# Patient Record
Sex: FEMALE | Race: WHITE | NOT HISPANIC OR LATINO | Employment: FULL TIME | URBAN - METROPOLITAN AREA
[De-identification: names, ages, dates, MRNs, and addresses within clinical notes are randomized per-mention and may not be internally consistent; named-entity substitution may affect disease eponyms.]

---

## 2017-01-09 ENCOUNTER — HOSPITAL ENCOUNTER (OUTPATIENT)
Dept: OBSTETRICS AND GYNECOLOGY | Facility: HOSPITAL | Age: 22
End: 2017-01-09
Attending: OBSTETRICS & GYNECOLOGY | Admitting: OBSTETRICS & GYNECOLOGY

## 2017-09-20 ENCOUNTER — HISTORICAL (OUTPATIENT)
Dept: LAB | Facility: HOSPITAL | Age: 22
End: 2017-09-20

## 2018-08-22 ENCOUNTER — HISTORICAL (OUTPATIENT)
Dept: ADMINISTRATIVE | Facility: HOSPITAL | Age: 23
End: 2018-08-22

## 2019-02-06 ENCOUNTER — HISTORICAL (OUTPATIENT)
Dept: RADIOLOGY | Facility: HOSPITAL | Age: 24
End: 2019-02-06

## 2019-03-08 ENCOUNTER — HOSPITAL ENCOUNTER (OUTPATIENT)
Dept: OCCUPATIONAL THERAPY | Facility: HOSPITAL | Age: 24
End: 2019-03-09
Attending: OTOLARYNGOLOGY | Admitting: OTOLARYNGOLOGY

## 2019-03-08 LAB — POC BETA-HCG (QUAL): NEGATIVE

## 2019-06-26 ENCOUNTER — HISTORICAL (OUTPATIENT)
Dept: RADIOLOGY | Facility: HOSPITAL | Age: 24
End: 2019-06-26

## 2019-07-11 ENCOUNTER — HISTORICAL (OUTPATIENT)
Dept: LAB | Facility: HOSPITAL | Age: 24
End: 2019-07-11

## 2019-07-11 LAB
B-HCG FREE SERPL-ACNC: <1 MIU/ML
POC BETA-HCG (QUAL): NEGATIVE

## 2020-01-02 ENCOUNTER — HISTORICAL (OUTPATIENT)
Dept: LAB | Facility: HOSPITAL | Age: 25
End: 2020-01-02

## 2020-04-22 LAB
CLARITY, POC UA: CLEAR
COLOR, POC UA: YELLOW
GLUCOSE UR QL STRIP: NEGATIVE
LEUKOCYTE EST, POC UA: NEGATIVE
NITRITE, POC UA: NEGATIVE
PROTEIN, POC: NEGATIVE

## 2020-05-19 LAB
CLARITY, POC UA: CLEAR
COLOR, POC UA: YELLOW
GLUCOSE UR QL STRIP: NEGATIVE
LEUKOCYTE EST, POC UA: NEGATIVE
NITRITE, POC UA: NEGATIVE
PROTEIN, POC: NORMAL

## 2020-06-30 LAB
CLARITY, POC UA: NORMAL
COLOR, POC UA: YELLOW
GLUCOSE UR QL STRIP: NEGATIVE
LEUKOCYTE EST, POC UA: NEGATIVE
NITRITE, POC UA: NEGATIVE
PROTEIN, POC: NEGATIVE

## 2021-01-06 ENCOUNTER — HISTORICAL (OUTPATIENT)
Dept: LAB | Facility: HOSPITAL | Age: 26
End: 2021-01-06

## 2021-01-06 LAB
ABS NEUT (OLG): 3.99
ALBUMIN SERPL-MCNC: 4.3 GM/DL (ref 3.5–5)
ALBUMIN/GLOB SERPL: 1.3 RATIO (ref 1.1–2)
ALP SERPL-CCNC: 110 UNIT/L (ref 40–150)
ALT SERPL-CCNC: 21 UNIT/L (ref 0–55)
APPEARANCE, UA: CLEAR
AST SERPL-CCNC: 17 UNIT/L (ref 5–34)
BACTERIA SPEC CULT: NORMAL
BASOPHILS # BLD AUTO: 0.02 X10(3)/MCL
BASOPHILS NFR BLD AUTO: 0.3 %
BILIRUB SERPL-MCNC: 0.5 MG/DL
BILIRUB UR QL STRIP: NEGATIVE
BILIRUBIN DIRECT+TOT PNL SERPL-MCNC: 0.2 MG/DL (ref 0–0.5)
BILIRUBIN DIRECT+TOT PNL SERPL-MCNC: 0.3 MG/DL
BUN SERPL-MCNC: 12 MG/DL (ref 7–18.7)
CALCIUM SERPL-MCNC: 9.4 MG/DL (ref 8.4–10.2)
CHLORIDE SERPL-SCNC: 107 MMOL/L (ref 98–107)
CO2 SERPL-SCNC: 28 MEQ/L (ref 22–29)
COLOR UR: YELLOW
CREAT SERPL-MCNC: 0.72 MG/DL (ref 0.55–1.02)
DEPRECATED CALCIDIOL+CALCIFEROL SERPL-MC: 15.8 NG/ML (ref 30–80)
EOSINOPHIL # BLD AUTO: 0.05 X10(3)/MCL
EOSINOPHIL NFR BLD AUTO: 0.8 %
ERYTHROCYTE [DISTWIDTH] IN BLOOD BY AUTOMATED COUNT: 12 %
GLOBULIN SER-MCNC: 3.4 GM/DL (ref 2.4–3.5)
GLUCOSE (UA): NEGATIVE
GLUCOSE SERPL-MCNC: 92 MG/DL (ref 74–100)
HCT VFR BLD AUTO: 41.7 % (ref 34–46)
HGB BLD-MCNC: 13.2 GM/DL (ref 11.3–15.4)
HGB UR QL STRIP: NEGATIVE
IMM GRANULOCYTES # BLD AUTO: 0.01 10*3/UL (ref 0–0.1)
IMM GRANULOCYTES NFR BLD AUTO: 0.2 % (ref 0–1)
KETONES UR QL STRIP: NEGATIVE
LEUKOCYTE ESTERASE UR QL STRIP: NEGATIVE
LYMPHOCYTES # BLD AUTO: 1.47 X10(3)/MCL
LYMPHOCYTES NFR BLD AUTO: 24.3 %
MCH RBC QN AUTO: 28.8 PG (ref 27–33)
MCHC RBC AUTO-ENTMCNC: 31.7 GM/DL (ref 32–35)
MCV RBC AUTO: 90.8 FL (ref 81–97)
MONOCYTES # BLD AUTO: 0.51 X10(3)/MCL
MONOCYTES NFR BLD AUTO: 8.4 %
MUCOUS THREADS URNS QL MICRO: PRESENT
NEUTROPHILS # BLD AUTO: 3.99 X10(3)/MCL
NEUTROPHILS NFR BLD AUTO: 66 %
NITRITE UR QL STRIP: NEGATIVE
PH UR STRIP: 6.5 [PH] (ref 4.6–8)
PLATELET # BLD AUTO: 171 X10(3)/MCL (ref 140–450)
PMV BLD AUTO: 10 FL
POTASSIUM SERPL-SCNC: 3.7 MMOL/L (ref 3.5–5.1)
PROT SERPL-MCNC: 7.7 GM/DL (ref 6.4–8.3)
PROT UR QL STRIP: NEGATIVE
RBC # BLD AUTO: 4.59 X10(6)/MCL (ref 3.9–5)
RBC #/AREA URNS HPF: NORMAL /[HPF]
SODIUM SERPL-SCNC: 142 MMOL/L (ref 136–145)
SP GR UR STRIP: 1.02 (ref 1–1.03)
SQUAMOUS EPITHELIAL, UA: NORMAL
TSH SERPL-ACNC: 0.44 UIU/ML (ref 0.35–4.94)
UROBILINOGEN UR STRIP-ACNC: 0.2
VIT B12 SERPL-MCNC: 361 PG/ML (ref 213–816)
WBC # SPEC AUTO: 6.05 X10(3)/MCL (ref 3.4–9.2)
WBC #/AREA URNS HPF: NORMAL /HPF

## 2021-02-18 ENCOUNTER — HISTORICAL (OUTPATIENT)
Dept: RADIOLOGY | Facility: HOSPITAL | Age: 26
End: 2021-02-18

## 2021-06-07 ENCOUNTER — HISTORICAL (OUTPATIENT)
Dept: RADIOLOGY | Facility: HOSPITAL | Age: 26
End: 2021-06-07

## 2021-06-10 ENCOUNTER — HISTORICAL (OUTPATIENT)
Dept: LAB | Facility: HOSPITAL | Age: 26
End: 2021-06-10

## 2021-12-02 ENCOUNTER — HISTORICAL (OUTPATIENT)
Dept: LAB | Facility: HOSPITAL | Age: 26
End: 2021-12-02

## 2021-12-02 LAB
T4 FREE SERPL-MCNC: 0.66 NG/DL (ref 0.7–1.48)
TSH SERPL-ACNC: 1.96 UIU/ML (ref 0.35–4.94)

## 2021-12-29 ENCOUNTER — HISTORICAL (OUTPATIENT)
Dept: LAB | Facility: HOSPITAL | Age: 26
End: 2021-12-29

## 2021-12-29 LAB
FLUAV AG UPPER RESP QL IA.RAPID: NEGATIVE
FLUBV AG UPPER RESP QL IA.RAPID: NEGATIVE
SARS-COV-2 RNA RESP QL NAA+PROBE: NOT DETECTED

## 2022-04-07 ENCOUNTER — HISTORICAL (OUTPATIENT)
Dept: ADMINISTRATIVE | Facility: HOSPITAL | Age: 27
End: 2022-04-07
Payer: MEDICAID

## 2022-04-24 VITALS
SYSTOLIC BLOOD PRESSURE: 106 MMHG | OXYGEN SATURATION: 99 % | DIASTOLIC BLOOD PRESSURE: 75 MMHG | WEIGHT: 162.06 LBS | BODY MASS INDEX: 27 KG/M2 | HEIGHT: 65 IN

## 2022-04-28 NOTE — OP NOTE
DATE OF SURGERY:    03/08/2019    SURGEON:  Eduar Huitron MD    PREOPERATIVE DIAGNOSIS:  Thyroglossal duct cyst.    POSTOPERATIVE DIAGNOSIS:  Thyroglossal duct cyst.    PROCEDURE PERFORMED:  Is excision thyroglossal duct cyst with removal of pyramidal middle lobe of the thyroid.    ANESTHESIA:  General endotracheal.    ESTIMATED BLOOD LOSS:  Is 79 mL.    INTRAOPERATIVE MEDICATIONS:  Clindamycin.    INTRAOPERATIVE FINDINGS:  Thyroglossal duct cyst in continuity with the pyramidal lobe of the thyroid.    INDICATION:  Tracey Montenegro is a 23-year-old lady who has a history of the above-mentioned problem.  She understood the risks, benefits, and alternatives to the procedure and consented to it.    DETAILS OF PROCEDURE:  She was brought to the operating room on 03/08/2019, anesthesia was induced in standard fashion with no complication.  The correct patient, procedure, and site were identified, and confirmed with OR personnel.  The operative site was inspected and then the area was prepped and draped in usual sterile fashion.  1/2% Marcaine with epi was injected in the standard dermal area and the 15 blade used to make an incision in a standard thyroid orientation.  The platysma was divided along with vessels.  Skin flaps were developed superiorly and inferiorly.  The strap muscles were retracted laterally out of the way and the thyroglossal duct cyst was exposed.  It was followed upwards to the point where it became simply a stalk of fascial-type tissue and was ligated at that area.  It was followed inferiorly to where it joined the pyramidal lobe of the thyroid.  This was  from the thyroid with the Bovie and sent for frozen section.  There was no atypia found on the frozen section.  The thyroid was then reapproximated in the midline by suturing the 2 lobes together with a 2-0 Vicryl suture and hemostasis was thus achieved.  An additional layer of hemostasis was achieved with an installation of 3  Iv started,blood sent to lab. Repositioned pt for comfort.  Called wound nurse , instructed wet to dry temporary dressing.    grams of hemostatic powder.  The platysma and dermis were then closed with deep 2-0 Vicryl suture and superficial running subcuticular 5-0 Monocryl over a ONEIDA 10 round drain.  It  was noted that there was an incidental deflating of the balloon and popping of the balloon in the lumen of the trachea when the needle was passed     and it went between the rings of the trachea and popped the balloon, but there was no untoward sequela from this.  The patient was then awakened and brought to recovery.        ______________________________  Eduar Huitron MD    CG/UM  DD:  03/08/2019  Time:  01:05PM  DT:  03/08/2019  Time:  01:22PM  Job #:  [id number]

## 2022-04-28 NOTE — DISCHARGE SUMMARY
Patient:   Tracey Montenegro            MRN: [id number]            FIN: [id number]               Age:   23 years     Sex:  Female     :  1995   Associated Diagnoses:   None   Author:   Eduar Huitron MD      tolerating diet  pain controlled with percocet    afebrile, vital signs stable     voice slightly rough  no hematoma  sutures and strips intact  drain DCd by me    Doing well  DC to home RTC 1 week

## 2022-04-28 NOTE — H&P
PREOPERATIVE DIAGNOSIS:  Thyroglossal duct cyst/neck mass.    HISTORY OF PRESENT ILLNESS:  Tracey Montenegro is a 23-year-old lady who has a history of a neck mass being present for approximately 4 years.  It has been slowly growing with no areas of drainage.  There has been some dysphagia and tenderness at the area.    DATA:  Reviewed.  Ultrasound of the neck shows a 2.5 cm midline complex cystic lesion.    PAST SURGICAL HISTORY:  Nexplanon placed in August 2018, as well as C section.    ALLERGIES:  NO KNOWN DRUG ALLERGIES MEDICATIONS.    SOCIAL HISTORY:  She is an occasional drinker and nonsmoker.  She works as a .  she is breastfeeding    PAST MEDICAL HISTORY:  Also includes depression.    REVIEW OF SYSTEMS:  EYES:  No blurry vision.     RESPIRATORY:  No shortness of breath, wheezing, cough.   CARDIOVASCULAR:  No chest pain, palpitations, or edema.   GASTROINTESTINAL:  No nausea, vomiting, but occasional diarrhea.    PHYSICAL EXAMINATION:  NECK:  Shows a 3 x 3 cm mass just anterior to the hyoid bone.  It goes up and down with swallowing.  It is only moderately fixed to underlying structures.  It does seem to be under the strap muscles of the neck.  There is a normal palpable thyroid in standard configuration.     HEENT:  Oral cavity oropharynx normal mucosa.  Extraocular motion intact.  Normal nasal mucosa.  Normal tympanic membrane and middle ear.     LUNGS:  Clear to auscultation bilaterally.    ASSESSMENT:  Thyroglossal duct cyst.      PLAN:  Excision.  We discussed risks, benefits, and alternatives at length in layman's terms.  We discussed the rare, but serious, things that could occur, as well as more common complications, and expectations.  Consent obtained.  All questions answered.  Proceed on 03/08.        ______________________________  Eduar Huitron MD    CG/UN  DD:  03/07/2019  Time:  02:03PM  DT:  03/07/2019  Time:  02:43PM  Job #:  [id number]    The H&P was reviewed, the patient was  examined, and the following changes to the patients condition are noted:  ______________________________________________________________________________  ______________________________________________________________________________  ______________________________________________________________________________  [  ] No changes to the patient's condition:      ______________________________                                             ___________________  PHYSICIAN SIGNATURE                                                             DATE/TIME

## 2022-05-04 RX ORDER — LORATADINE 10 MG/1
TABLET ORAL
COMMUNITY
Start: 2022-04-21 | End: 2023-05-29

## 2022-05-04 RX ORDER — FLUTICASONE PROPIONATE 50 MCG
SPRAY, SUSPENSION (ML) NASAL
COMMUNITY
Start: 2022-04-21 | End: 2022-06-14

## 2022-05-04 RX ORDER — SUMATRIPTAN 50 MG/1
TABLET, FILM COATED ORAL
COMMUNITY
Start: 2022-01-31 | End: 2022-06-14 | Stop reason: SDUPTHER

## 2022-05-04 RX ORDER — FLUOXETINE HYDROCHLORIDE 20 MG/1
20 CAPSULE ORAL DAILY
COMMUNITY
Start: 2021-12-02 | End: 2022-05-04 | Stop reason: SDUPTHER

## 2022-05-04 RX ORDER — TOPIRAMATE 25 MG/1
50 TABLET ORAL
COMMUNITY
Start: 2021-08-13 | End: 2022-06-14 | Stop reason: SDUPTHER

## 2022-05-04 RX ORDER — PROPRANOLOL HYDROCHLORIDE 10 MG/1
10 TABLET ORAL
COMMUNITY
Start: 2021-12-02 | End: 2022-06-14 | Stop reason: SDUPTHER

## 2022-05-04 RX ORDER — MONTELUKAST SODIUM 10 MG/1
TABLET ORAL
COMMUNITY
Start: 2022-04-21 | End: 2022-06-14

## 2022-05-04 RX ORDER — FLUOXETINE HYDROCHLORIDE 20 MG/1
20 CAPSULE ORAL DAILY
Qty: 90 CAPSULE | Refills: 0 | Status: SHIPPED | OUTPATIENT
Start: 2022-05-04 | End: 2022-06-29 | Stop reason: SDUPTHER

## 2022-06-08 ENCOUNTER — HOSPITAL ENCOUNTER (OUTPATIENT)
Dept: RADIOLOGY | Facility: HOSPITAL | Age: 27
Discharge: HOME | End: 2022-06-08
Attending: FAMILY MEDICINE
Payer: MEDICAID

## 2022-06-08 DIAGNOSIS — E04.2 MULTIPLE THYROID NODULES: ICD-10-CM

## 2022-06-08 PROCEDURE — 76536 US EXAM OF HEAD AND NECK: CPT | Mod: TC

## 2022-06-14 ENCOUNTER — OFFICE VISIT (OUTPATIENT)
Dept: FAMILY MEDICINE | Facility: CLINIC | Age: 27
End: 2022-06-14
Payer: MEDICAID

## 2022-06-14 ENCOUNTER — LAB VISIT (OUTPATIENT)
Dept: LAB | Facility: HOSPITAL | Age: 27
End: 2022-06-14
Attending: FAMILY MEDICINE
Payer: MEDICAID

## 2022-06-14 VITALS
TEMPERATURE: 98 F | WEIGHT: 169.56 LBS | OXYGEN SATURATION: 99 % | HEIGHT: 65 IN | HEART RATE: 79 BPM | RESPIRATION RATE: 16 BRPM | DIASTOLIC BLOOD PRESSURE: 54 MMHG | BODY MASS INDEX: 28.25 KG/M2 | SYSTOLIC BLOOD PRESSURE: 101 MMHG

## 2022-06-14 DIAGNOSIS — F33.42 RECURRENT MAJOR DEPRESSIVE DISORDER, IN FULL REMISSION: ICD-10-CM

## 2022-06-14 DIAGNOSIS — E04.2 MULTIPLE THYROID NODULES: ICD-10-CM

## 2022-06-14 DIAGNOSIS — N94.2 VAGINISMUS: ICD-10-CM

## 2022-06-14 DIAGNOSIS — G43.009 MIGRAINE WITHOUT AURA AND WITHOUT STATUS MIGRAINOSUS, NOT INTRACTABLE: ICD-10-CM

## 2022-06-14 PROBLEM — F41.9 ANXIETY: Status: ACTIVE | Noted: 2022-06-14

## 2022-06-14 PROBLEM — F33.9 RECURRENT MAJOR DEPRESSIVE DISORDER: Status: ACTIVE | Noted: 2022-06-14

## 2022-06-14 LAB
ANION GAP SERPL CALC-SCNC: 10 MEQ/L
BASOPHILS # BLD AUTO: 0.05 X10(3)/MCL (ref 0–0.2)
BASOPHILS NFR BLD AUTO: 0.9 %
BUN SERPL-MCNC: 8 MG/DL (ref 7–18.7)
CALCIUM SERPL-MCNC: 9 MG/DL (ref 8.4–10.2)
CHLORIDE SERPL-SCNC: 110 MMOL/L (ref 98–107)
CO2 SERPL-SCNC: 24 MMOL/L (ref 22–29)
CREAT SERPL-MCNC: 0.78 MG/DL (ref 0.55–1.02)
CREAT/UREA NIT SERPL: 10
EOSINOPHIL # BLD AUTO: 0.11 X10(3)/MCL (ref 0–0.9)
EOSINOPHIL NFR BLD AUTO: 1.9 %
ERYTHROCYTE [DISTWIDTH] IN BLOOD BY AUTOMATED COUNT: 13 % (ref 11.5–17)
GLUCOSE SERPL-MCNC: 111 MG/DL (ref 74–100)
HCT VFR BLD AUTO: 35.1 % (ref 37–47)
HGB BLD-MCNC: 11.1 GM/DL (ref 12–16)
IMM GRANULOCYTES # BLD AUTO: 0.01 X10(3)/MCL (ref 0–0.02)
IMM GRANULOCYTES NFR BLD AUTO: 0.2 % (ref 0–0.43)
LYMPHOCYTES # BLD AUTO: 1.76 X10(3)/MCL (ref 0.6–4.6)
LYMPHOCYTES NFR BLD AUTO: 30.6 %
MCH RBC QN AUTO: 28.8 PG (ref 27–31)
MCHC RBC AUTO-ENTMCNC: 31.6 MG/DL (ref 33–36)
MCV RBC AUTO: 90.9 FL (ref 80–94)
MONOCYTES # BLD AUTO: 0.34 X10(3)/MCL (ref 0.1–1.3)
MONOCYTES NFR BLD AUTO: 5.9 %
NEUTROPHILS # BLD AUTO: 3.5 X10(3)/MCL (ref 2.1–9.2)
NEUTROPHILS NFR BLD AUTO: 60.5 %
NRBC BLD AUTO-RTO: 0 %
PLATELET # BLD AUTO: 144 X10(3)/MCL (ref 130–400)
PMV BLD AUTO: 10.5 FL (ref 9.4–12.4)
POTASSIUM SERPL-SCNC: 3.7 MMOL/L (ref 3.5–5.1)
RBC # BLD AUTO: 3.86 X10(6)/MCL (ref 4.2–5.4)
SODIUM SERPL-SCNC: 144 MMOL/L (ref 136–145)
T4 FREE SERPL-MCNC: 0.85 NG/DL (ref 0.7–1.48)
TSH SERPL-ACNC: 0.82 UIU/ML (ref 0.35–4.94)
WBC # SPEC AUTO: 5.8 X10(3)/MCL (ref 4.5–11.5)

## 2022-06-14 PROCEDURE — 36415 COLL VENOUS BLD VENIPUNCTURE: CPT

## 2022-06-14 PROCEDURE — 1159F MED LIST DOCD IN RCRD: CPT | Mod: CPTII,,, | Performed by: FAMILY MEDICINE

## 2022-06-14 PROCEDURE — 3078F DIAST BP <80 MM HG: CPT | Mod: CPTII,,, | Performed by: FAMILY MEDICINE

## 2022-06-14 PROCEDURE — 1159F PR MEDICATION LIST DOCUMENTED IN MEDICAL RECORD: ICD-10-PCS | Mod: CPTII,,, | Performed by: FAMILY MEDICINE

## 2022-06-14 PROCEDURE — 3074F PR MOST RECENT SYSTOLIC BLOOD PRESSURE < 130 MM HG: ICD-10-PCS | Mod: CPTII,,, | Performed by: FAMILY MEDICINE

## 2022-06-14 PROCEDURE — 99214 PR OFFICE/OUTPT VISIT, EST, LEVL IV, 30-39 MIN: ICD-10-PCS | Mod: ,,, | Performed by: FAMILY MEDICINE

## 2022-06-14 PROCEDURE — 3008F PR BODY MASS INDEX (BMI) DOCUMENTED: ICD-10-PCS | Mod: CPTII,,, | Performed by: FAMILY MEDICINE

## 2022-06-14 PROCEDURE — 3008F BODY MASS INDEX DOCD: CPT | Mod: CPTII,,, | Performed by: FAMILY MEDICINE

## 2022-06-14 PROCEDURE — 1160F PR REVIEW ALL MEDS BY PRESCRIBER/CLIN PHARMACIST DOCUMENTED: ICD-10-PCS | Mod: CPTII,,, | Performed by: FAMILY MEDICINE

## 2022-06-14 PROCEDURE — 99214 OFFICE O/P EST MOD 30 MIN: CPT | Mod: ,,, | Performed by: FAMILY MEDICINE

## 2022-06-14 PROCEDURE — 3078F PR MOST RECENT DIASTOLIC BLOOD PRESSURE < 80 MM HG: ICD-10-PCS | Mod: CPTII,,, | Performed by: FAMILY MEDICINE

## 2022-06-14 PROCEDURE — 85025 COMPLETE CBC W/AUTO DIFF WBC: CPT

## 2022-06-14 PROCEDURE — 84443 ASSAY THYROID STIM HORMONE: CPT

## 2022-06-14 PROCEDURE — 80048 BASIC METABOLIC PNL TOTAL CA: CPT

## 2022-06-14 PROCEDURE — 84439 ASSAY OF FREE THYROXINE: CPT

## 2022-06-14 PROCEDURE — 1160F RVW MEDS BY RX/DR IN RCRD: CPT | Mod: CPTII,,, | Performed by: FAMILY MEDICINE

## 2022-06-14 PROCEDURE — 3074F SYST BP LT 130 MM HG: CPT | Mod: CPTII,,, | Performed by: FAMILY MEDICINE

## 2022-06-14 RX ORDER — PROPRANOLOL HYDROCHLORIDE 10 MG/1
30 TABLET ORAL 3 TIMES DAILY
Qty: 270 TABLET | Refills: 11 | Status: SHIPPED | OUTPATIENT
Start: 2022-06-14 | End: 2023-05-29 | Stop reason: SDUPTHER

## 2022-06-14 RX ORDER — TOPIRAMATE 25 MG/1
50 TABLET ORAL 2 TIMES DAILY
Qty: 120 TABLET | Refills: 11 | Status: SHIPPED | OUTPATIENT
Start: 2022-06-14 | End: 2023-05-29 | Stop reason: SDUPTHER

## 2022-06-14 RX ORDER — SUMATRIPTAN 50 MG/1
50 TABLET, FILM COATED ORAL ONCE
Qty: 9 TABLET | Refills: 6 | Status: SHIPPED | OUTPATIENT
Start: 2022-06-14 | End: 2023-05-29 | Stop reason: SDUPTHER

## 2022-06-14 NOTE — PROGRESS NOTES
Tracey Montenegro  06/14/2022  [id number]    Monserrat Boyd MD  Patient Care Team:  Monserrat Valadez MD as PCP - General (Family Medicine)      Chief Complaint:  Chief Complaint   Patient presents with    Follow-up       History of Present Illness:  HPI    26 y.o. female who presents today thyroid US follow up for multiple thyroid nodules. It has several nodules that are tirads 3 and one is very close to meeting criteria for FNA. I will refer to ent. She denies dysphagia but does feel swelling around thyroid.     Depression: well controlled with prozac. Denies si/hi. She is taking propranolol 30 mg at a time prn anxiety and it works well for her so she would like a new prescription for this. She agrees to counseling as she is struggling in her marriage.     Migraine: was seeing neurology but neurologist left the practice and no one else there takes her insurance. She is currently doing well on propranolol, topamax and imitrex.     She also c/o pain with sex, inserting tampons for years. States she never had any vaginal deliveries and this started after the birth of her first child. She is asking for a gyn referral.     Review of Systems  General: denies f/c, weight loss, night sweats, decreased appetite  Eye: denies blurred vision, changes in vision  Respiratory: denies sob, wheezing, cough  Cardiovascular: denies chest pain, palpitations, edema  Gastrointestinal: denies abdominal pain, n/v, constipation, diarrhea  Integumentary: denies rashes, pruritis        Health Maintenance  Health Maintenance Topics with due status: Not Due       Topic Last Completion Date    TETANUS VACCINE 05/19/2020     Health Maintenance Due   Topic Date Due    Hepatitis C Screening  Never done    Lipid Panel  Never done    COVID-19 Vaccine (1) Never done    HIV Screening  Never done    Pap Smear  02/08/2022       Exam:  Vitals:    06/14/22 1346   BP: (!) 101/54   Pulse: 79   Resp: 16   Temp: 97.9 °F (36.6 °C)     Weight:  76.9 kg (169 lb 8.5 oz)   Body mass index is 27.91 kg/m².      Physical Exam  Constitutional: NAD, alert, pleasant  Respiratory: CTAB, no wheezes, rales or rhonchi. No accessory muscle use  Eyes: EOMI  Cardiovascular: RRR, No m/r/g. No JVD. No LE edema  Gastrointestinal: BS+, nontender, nondistended  Integumentary: warm, dry, intact  Psych: AA&Ox3        1. Multiple thyroid nodules  Overview:  F/u US one year later shows multiple enlarged nodules of Ti-Rads 3. One is very close to meet criteria for biopsy so I will refer to ENT. Patient work up for hashimoto's was negative.     Assessment & Plan:  Refer to ENT    Orders:  -     TSH  -     T4, Free  -     CBC Auto Differential  -     Basic Metabolic Panel  -     Ambulatory referral/consult to ENT    2. Recurrent major depressive disorder, in full remission  Overview:  Doing well on prozac 20 mg daily. Denies si/hi.     Assessment & Plan:  Continue prozac 20 mg daily and rtc in 6 mts  Refer to counseling    Orders:  -     Ambulatory referral/consult to Psychology    3. Migraine without aura and without status migrainosus, not intractable  Overview:  Well controlled with topamax, imitrex and prorpranolol    Assessment & Plan:  Will refill meds today    Orders:  -     topiramate (TOPAMAX) 25 MG tablet  -     propranoloL (INDERAL) 10 MG tablet  -     sumatriptan (IMITREX) 50 MG tablet    4. Vaginismus  Overview:  Patient c/o vaginal pain with even using tampons and sex. She has never delivered vaginally. She is requesting a gyn referral.    Assessment & Plan:  Refer to gyn    Orders:  -     Ambulatory referral/consult to Obstetrics / Gynecology       Follow up: No follow-ups on file.      Care Plan/Goals: Reviewed   Goals    None

## 2022-06-16 ENCOUNTER — PATIENT MESSAGE (OUTPATIENT)
Dept: FAMILY MEDICINE | Facility: CLINIC | Age: 27
End: 2022-06-16
Payer: MEDICAID

## 2022-06-16 DIAGNOSIS — F51.01 PRIMARY INSOMNIA: Primary | ICD-10-CM

## 2022-06-16 RX ORDER — HYDROXYZINE PAMOATE 25 MG/1
50 CAPSULE ORAL NIGHTLY
Qty: 30 CAPSULE | Refills: 3 | Status: SHIPPED | OUTPATIENT
Start: 2022-06-16 | End: 2023-05-29 | Stop reason: SDUPTHER

## 2022-06-29 ENCOUNTER — OFFICE VISIT (OUTPATIENT)
Dept: BEHAVIORAL HEALTH | Facility: CLINIC | Age: 27
End: 2022-06-29
Payer: MEDICAID

## 2022-06-29 VITALS
BODY MASS INDEX: 28.37 KG/M2 | DIASTOLIC BLOOD PRESSURE: 65 MMHG | HEART RATE: 61 BPM | HEIGHT: 65 IN | RESPIRATION RATE: 20 BRPM | SYSTOLIC BLOOD PRESSURE: 111 MMHG | TEMPERATURE: 98 F | WEIGHT: 170.31 LBS | OXYGEN SATURATION: 97 %

## 2022-06-29 DIAGNOSIS — F34.1 PERSISTENT DEPRESSIVE DISORDER: Primary | ICD-10-CM

## 2022-06-29 DIAGNOSIS — F41.1 GENERALIZED ANXIETY DISORDER: ICD-10-CM

## 2022-06-29 LAB
AMPHET UR QL SCN: NEGATIVE
BARBITURATE SCN PRESENT UR: NEGATIVE
BENZODIAZ UR QL SCN: NEGATIVE
CANNABINOIDS UR QL SCN: NEGATIVE
COCAINE UR QL SCN: NEGATIVE
FENTANYL UR QL SCN: NEGATIVE
MDMA UR QL SCN: NEGATIVE
OPIATES UR QL SCN: NEGATIVE
PCP UR QL: NEGATIVE
PH UR: 6 [PH] (ref 3–11)
SPECIFIC GRAVITY, URINE AUTO (.000) (OHS): 1.02 (ref 1–1.03)

## 2022-06-29 PROCEDURE — 3008F BODY MASS INDEX DOCD: CPT | Mod: CPTII,AF,HB, | Performed by: STUDENT IN AN ORGANIZED HEALTH CARE EDUCATION/TRAINING PROGRAM

## 2022-06-29 PROCEDURE — 99204 OFFICE O/P NEW MOD 45 MIN: CPT | Mod: S$PBB,AF,HB, | Performed by: STUDENT IN AN ORGANIZED HEALTH CARE EDUCATION/TRAINING PROGRAM

## 2022-06-29 PROCEDURE — 1159F MED LIST DOCD IN RCRD: CPT | Mod: CPTII,AF,HB, | Performed by: STUDENT IN AN ORGANIZED HEALTH CARE EDUCATION/TRAINING PROGRAM

## 2022-06-29 PROCEDURE — 1160F RVW MEDS BY RX/DR IN RCRD: CPT | Mod: CPTII,AF,HB, | Performed by: STUDENT IN AN ORGANIZED HEALTH CARE EDUCATION/TRAINING PROGRAM

## 2022-06-29 PROCEDURE — 99214 OFFICE O/P EST MOD 30 MIN: CPT | Mod: PBBFAC,PN | Performed by: STUDENT IN AN ORGANIZED HEALTH CARE EDUCATION/TRAINING PROGRAM

## 2022-06-29 PROCEDURE — 3078F PR MOST RECENT DIASTOLIC BLOOD PRESSURE < 80 MM HG: ICD-10-PCS | Mod: CPTII,AF,HB, | Performed by: STUDENT IN AN ORGANIZED HEALTH CARE EDUCATION/TRAINING PROGRAM

## 2022-06-29 PROCEDURE — 99204 PR OFFICE/OUTPT VISIT, NEW, LEVL IV, 45-59 MIN: ICD-10-PCS | Mod: S$PBB,AF,HB, | Performed by: STUDENT IN AN ORGANIZED HEALTH CARE EDUCATION/TRAINING PROGRAM

## 2022-06-29 PROCEDURE — 3074F SYST BP LT 130 MM HG: CPT | Mod: CPTII,AF,HB, | Performed by: STUDENT IN AN ORGANIZED HEALTH CARE EDUCATION/TRAINING PROGRAM

## 2022-06-29 PROCEDURE — 80307 DRUG TEST PRSMV CHEM ANLYZR: CPT | Performed by: STUDENT IN AN ORGANIZED HEALTH CARE EDUCATION/TRAINING PROGRAM

## 2022-06-29 PROCEDURE — 1159F PR MEDICATION LIST DOCUMENTED IN MEDICAL RECORD: ICD-10-PCS | Mod: CPTII,AF,HB, | Performed by: STUDENT IN AN ORGANIZED HEALTH CARE EDUCATION/TRAINING PROGRAM

## 2022-06-29 PROCEDURE — 3078F DIAST BP <80 MM HG: CPT | Mod: CPTII,AF,HB, | Performed by: STUDENT IN AN ORGANIZED HEALTH CARE EDUCATION/TRAINING PROGRAM

## 2022-06-29 PROCEDURE — 1160F PR REVIEW ALL MEDS BY PRESCRIBER/CLIN PHARMACIST DOCUMENTED: ICD-10-PCS | Mod: CPTII,AF,HB, | Performed by: STUDENT IN AN ORGANIZED HEALTH CARE EDUCATION/TRAINING PROGRAM

## 2022-06-29 PROCEDURE — 3008F PR BODY MASS INDEX (BMI) DOCUMENTED: ICD-10-PCS | Mod: CPTII,AF,HB, | Performed by: STUDENT IN AN ORGANIZED HEALTH CARE EDUCATION/TRAINING PROGRAM

## 2022-06-29 PROCEDURE — 3074F PR MOST RECENT SYSTOLIC BLOOD PRESSURE < 130 MM HG: ICD-10-PCS | Mod: CPTII,AF,HB, | Performed by: STUDENT IN AN ORGANIZED HEALTH CARE EDUCATION/TRAINING PROGRAM

## 2022-06-29 RX ORDER — FLUOXETINE HYDROCHLORIDE 40 MG/1
40 CAPSULE ORAL DAILY
Qty: 90 CAPSULE | Refills: 0 | Status: SHIPPED | OUTPATIENT
Start: 2022-06-29 | End: 2023-05-29 | Stop reason: SDUPTHER

## 2022-09-15 ENCOUNTER — HISTORICAL (OUTPATIENT)
Dept: ADMINISTRATIVE | Facility: HOSPITAL | Age: 27
End: 2022-09-15
Payer: MEDICAID

## 2023-02-16 ENCOUNTER — HOSPITAL ENCOUNTER (EMERGENCY)
Facility: HOSPITAL | Age: 28
Discharge: HOME | End: 2023-02-16
Attending: INTERNAL MEDICINE
Payer: MEDICAID

## 2023-02-16 VITALS
RESPIRATION RATE: 18 BRPM | SYSTOLIC BLOOD PRESSURE: 109 MMHG | BODY MASS INDEX: 28.32 KG/M2 | OXYGEN SATURATION: 100 % | TEMPERATURE: 98 F | HEIGHT: 65 IN | DIASTOLIC BLOOD PRESSURE: 74 MMHG | HEART RATE: 81 BPM | WEIGHT: 170 LBS

## 2023-02-16 DIAGNOSIS — G43.009 MIGRAINE WITHOUT AURA AND WITHOUT STATUS MIGRAINOSUS, NOT INTRACTABLE: Primary | ICD-10-CM

## 2023-02-16 PROCEDURE — 25000003 PHARM REV CODE 250: Performed by: INTERNAL MEDICINE

## 2023-02-16 PROCEDURE — 96374 THER/PROPH/DIAG INJ IV PUSH: CPT

## 2023-02-16 PROCEDURE — 63600175 PHARM REV CODE 636 W HCPCS: Performed by: INTERNAL MEDICINE

## 2023-02-16 PROCEDURE — 96361 HYDRATE IV INFUSION ADD-ON: CPT

## 2023-02-16 PROCEDURE — 96375 TX/PRO/DX INJ NEW DRUG ADDON: CPT

## 2023-02-16 PROCEDURE — 99284 EMERGENCY DEPT VISIT MOD MDM: CPT | Mod: 25

## 2023-02-16 RX ORDER — DIPHENHYDRAMINE HYDROCHLORIDE 50 MG/ML
25 INJECTION INTRAMUSCULAR; INTRAVENOUS
Status: COMPLETED | OUTPATIENT
Start: 2023-02-16 | End: 2023-02-16

## 2023-02-16 RX ORDER — KETOROLAC TROMETHAMINE 30 MG/ML
30 INJECTION, SOLUTION INTRAMUSCULAR; INTRAVENOUS
Status: COMPLETED | OUTPATIENT
Start: 2023-02-16 | End: 2023-02-16

## 2023-02-16 RX ORDER — ONDANSETRON 4 MG/1
4 TABLET, ORALLY DISINTEGRATING ORAL
Status: COMPLETED | OUTPATIENT
Start: 2023-02-16 | End: 2023-02-16

## 2023-02-16 RX ORDER — KETOROLAC TROMETHAMINE 10 MG/1
10 TABLET, FILM COATED ORAL EVERY 6 HOURS
Qty: 20 TABLET | Refills: 0 | Status: SHIPPED | OUTPATIENT
Start: 2023-02-16 | End: 2023-02-21

## 2023-02-16 RX ORDER — ONDANSETRON 2 MG/ML
4 INJECTION INTRAMUSCULAR; INTRAVENOUS
Status: COMPLETED | OUTPATIENT
Start: 2023-02-16 | End: 2023-02-16

## 2023-02-16 RX ADMIN — ONDANSETRON 4 MG: 4 TABLET, ORALLY DISINTEGRATING ORAL at 08:02

## 2023-02-16 RX ADMIN — KETOROLAC TROMETHAMINE 30 MG: 30 INJECTION, SOLUTION INTRAMUSCULAR; INTRAVENOUS at 06:02

## 2023-02-16 RX ADMIN — DIPHENHYDRAMINE HYDROCHLORIDE 25 MG: 50 INJECTION, SOLUTION INTRAMUSCULAR; INTRAVENOUS at 06:02

## 2023-02-16 RX ADMIN — ONDANSETRON HYDROCHLORIDE 4 MG: 2 SOLUTION INTRAMUSCULAR; INTRAVENOUS at 06:02

## 2023-02-16 RX ADMIN — SODIUM CHLORIDE 1000 ML: 9 INJECTION, SOLUTION INTRAVENOUS at 06:02

## 2023-02-17 DIAGNOSIS — F51.01 PRIMARY INSOMNIA: ICD-10-CM

## 2023-02-17 NOTE — ED NOTES
Pt ambulated to ed rm 4 from [facility]. Aaox4. Reports migraine headache for a couple of hours that feels like a sharp pain behind the left eye for a couple of hours. Pt has hx of migraines and took her home medication with no relief. Pt states the pain behind her eye is making her have blurred vision in the left eye. Denies any other symptoms. Family at bedside. In no distress. On monitors. Lighting adjusted. Wctm

## 2023-02-17 NOTE — ED PROVIDER NOTES
Encounter Date: 2023       History     Chief Complaint   Patient presents with    Migraine     Pt reports a migraine with a sharp pain in left eyeball x 2 hours.  Also reports left eye vision is blurry.       This is a 27-year-old female patient came into the emergency room with history of having headaches for last 2 hours mainly reports around the left frontal and left periorbital area.  No tingling and numbness of the extremities.  Reports mild photophobia and vision changes.  Does have history of migraine and takes sumatriptan and Topamax at home.  Reports nausea but no vomiting.      Review of patient's allergies indicates:   Allergen Reactions    Pepper (genus capsicum) Itching     Past Medical History:   Diagnosis Date    Anxiety disorder, unspecified     Carrier of group B Streptococcus     Chronic migraine without aura     Family history of breast cancer     Family history of cervical cancer     mother and maternal grandmother    Major depression, recurrent     Maternal obesity syndrome     Myopia of both eyes with astigmatism     Non-toxic multinodular goiter     Panic disorder (episodic paroxysmal anxiety)     Thyroiditis, unspecified      Past Surgical History:   Procedure Laterality Date     SECTION  2017    Dr Miguel Romo     SECTION  2018    THYROGLOSSAL DUCT EXCISION  2019    Dr Eduar Huitron     Family History   Problem Relation Age of Onset    Cervical cancer Mother     Bipolar disorder Mother     Depression Mother     Lung cancer Maternal Grandmother     Breast cancer Maternal Grandmother     Cervical cancer Maternal Grandmother     Bipolar disorder Maternal Grandmother     Depression Maternal Grandmother     Thyroid disease Maternal Aunt      Social History     Tobacco Use    Smoking status: Never    Smokeless tobacco: Never   Substance Use Topics    Alcohol use: Never    Drug use: Never     Review of Systems   Constitutional: Negative.    HENT: Negative.      Eyes: Negative.    Respiratory: Negative.     Cardiovascular: Negative.    Gastrointestinal:  Positive for nausea. Negative for vomiting.   Endocrine: Negative.    Genitourinary: Negative.    Musculoskeletal: Negative.    Skin: Negative.    Neurological:  Positive for headaches. Negative for seizures, syncope and numbness.   Hematological: Negative.    Psychiatric/Behavioral: Negative.     All other systems reviewed and are negative.    Physical Exam     Initial Vitals [02/16/23 1823]   BP Pulse Resp Temp SpO2   (!) 126/101 88 18 97.8 °F (36.6 °C) 99 %      MAP       --         Physical Exam    Nursing note and vitals reviewed.  Constitutional: She appears well-developed and well-nourished.   HENT:   Head: Normocephalic and atraumatic.   Eyes: Conjunctivae and EOM are normal.   Neck: Neck supple.   Normal range of motion.  Cardiovascular:  Normal rate, regular rhythm, normal heart sounds and intact distal pulses.           Pulmonary/Chest: Breath sounds normal.   Abdominal: Abdomen is soft. Bowel sounds are normal.   Musculoskeletal:         General: Normal range of motion.      Cervical back: Normal range of motion and neck supple.     Neurological: She is alert and oriented to person, place, and time. She has normal reflexes. GCS score is 15. GCS eye subscore is 4. GCS verbal subscore is 5. GCS motor subscore is 6.   Skin: Skin is warm and dry. Capillary refill takes less than 2 seconds.       ED Course   Procedures  Labs Reviewed - No data to display       Imaging Results    None          Medications   sodium chloride 0.9% bolus 1,000 mL 1,000 mL (1,000 mLs Intravenous New Bag 2/16/23 1839)   ketorolac injection 30 mg (30 mg Intravenous Given 2/16/23 1839)   diphenhydrAMINE injection 25 mg (25 mg Intravenous Given 2/16/23 1839)   ondansetron injection 4 mg (4 mg Intravenous Given 2/16/23 1839)     Medical Decision Making:   Initial Assessment:   Patient does have headache, nausea.  Does have history of  migraine.    1. Migraine     Differential Diagnosis:   1. Migraine headache   2. Tension headache  3. Cluster headache  ED Management:  Patient has been given ketorolac, Zofran, Benadryl.                        Clinical Impression:   Final diagnoses:  [G43.009] Migraine without aura and without status migrainosus, not intractable (Primary)        ED Disposition Condition    Discharge Stable          ED Prescriptions    None       Follow-up Information       Follow up With Specialties Details Why Contact Info    Neurologist                 Louie Navas DO  02/16/23 1920

## 2023-02-20 RX ORDER — HYDROXYZINE PAMOATE 25 MG/1
CAPSULE ORAL
Qty: 30 CAPSULE | Refills: 3 | OUTPATIENT
Start: 2023-02-20

## 2023-05-29 ENCOUNTER — OFFICE VISIT (OUTPATIENT)
Dept: FAMILY MEDICINE | Facility: CLINIC | Age: 28
End: 2023-05-29
Payer: MEDICAID

## 2023-05-29 VITALS
WEIGHT: 177 LBS | BODY MASS INDEX: 29.49 KG/M2 | TEMPERATURE: 97 F | SYSTOLIC BLOOD PRESSURE: 129 MMHG | HEIGHT: 65 IN | DIASTOLIC BLOOD PRESSURE: 83 MMHG | HEART RATE: 120 BPM | RESPIRATION RATE: 20 BRPM

## 2023-05-29 DIAGNOSIS — F41.1 GENERALIZED ANXIETY DISORDER: ICD-10-CM

## 2023-05-29 DIAGNOSIS — F51.01 PRIMARY INSOMNIA: ICD-10-CM

## 2023-05-29 DIAGNOSIS — F34.1 PERSISTENT DEPRESSIVE DISORDER: ICD-10-CM

## 2023-05-29 DIAGNOSIS — Z01.419 WELL WOMAN EXAM: Primary | ICD-10-CM

## 2023-05-29 DIAGNOSIS — G43.009 MIGRAINE WITHOUT AURA AND WITHOUT STATUS MIGRAINOSUS, NOT INTRACTABLE: ICD-10-CM

## 2023-05-29 PROCEDURE — 1159F MED LIST DOCD IN RCRD: CPT | Mod: CPTII,,, | Performed by: NURSE PRACTITIONER

## 2023-05-29 PROCEDURE — 3008F PR BODY MASS INDEX (BMI) DOCUMENTED: ICD-10-PCS | Mod: CPTII,,, | Performed by: NURSE PRACTITIONER

## 2023-05-29 PROCEDURE — 3074F SYST BP LT 130 MM HG: CPT | Mod: CPTII,,, | Performed by: NURSE PRACTITIONER

## 2023-05-29 PROCEDURE — 3079F DIAST BP 80-89 MM HG: CPT | Mod: CPTII,,, | Performed by: NURSE PRACTITIONER

## 2023-05-29 PROCEDURE — 3074F PR MOST RECENT SYSTOLIC BLOOD PRESSURE < 130 MM HG: ICD-10-PCS | Mod: CPTII,,, | Performed by: NURSE PRACTITIONER

## 2023-05-29 PROCEDURE — 99395 PREV VISIT EST AGE 18-39: CPT | Mod: ,,, | Performed by: NURSE PRACTITIONER

## 2023-05-29 PROCEDURE — 1159F PR MEDICATION LIST DOCUMENTED IN MEDICAL RECORD: ICD-10-PCS | Mod: CPTII,,, | Performed by: NURSE PRACTITIONER

## 2023-05-29 PROCEDURE — 99395 PR PREVENTIVE VISIT,EST,18-39: ICD-10-PCS | Mod: ,,, | Performed by: NURSE PRACTITIONER

## 2023-05-29 PROCEDURE — 3079F PR MOST RECENT DIASTOLIC BLOOD PRESSURE 80-89 MM HG: ICD-10-PCS | Mod: CPTII,,, | Performed by: NURSE PRACTITIONER

## 2023-05-29 PROCEDURE — 3008F BODY MASS INDEX DOCD: CPT | Mod: CPTII,,, | Performed by: NURSE PRACTITIONER

## 2023-05-29 RX ORDER — SUMATRIPTAN 50 MG/1
50 TABLET, FILM COATED ORAL ONCE
Qty: 1 TABLET | Refills: 0 | Status: SHIPPED | OUTPATIENT
Start: 2023-05-29 | End: 2023-12-11

## 2023-05-29 RX ORDER — KETOROLAC TROMETHAMINE 10 MG/1
10 TABLET, FILM COATED ORAL EVERY 6 HOURS
COMMUNITY
End: 2023-05-29

## 2023-05-29 RX ORDER — PROPRANOLOL HYDROCHLORIDE 10 MG/1
30 TABLET ORAL 3 TIMES DAILY
Qty: 270 TABLET | Refills: 11 | Status: SHIPPED | OUTPATIENT
Start: 2023-05-29 | End: 2023-12-11

## 2023-05-29 RX ORDER — FLUOXETINE HYDROCHLORIDE 40 MG/1
40 CAPSULE ORAL DAILY
Qty: 90 CAPSULE | Refills: 0 | Status: SHIPPED | OUTPATIENT
Start: 2023-05-29 | End: 2023-12-11

## 2023-05-29 RX ORDER — KETOROLAC TROMETHAMINE 10 MG/1
10 TABLET, FILM COATED ORAL EVERY 6 HOURS
Qty: 20 TABLET | Refills: 0 | Status: SHIPPED | OUTPATIENT
Start: 2023-05-29 | End: 2023-06-03

## 2023-05-29 RX ORDER — TOPIRAMATE 25 MG/1
50 TABLET ORAL 2 TIMES DAILY
Qty: 120 TABLET | Refills: 11 | Status: SHIPPED | OUTPATIENT
Start: 2023-05-29 | End: 2024-05-23

## 2023-05-29 RX ORDER — HYDROXYZINE PAMOATE 25 MG/1
50 CAPSULE ORAL NIGHTLY
Qty: 30 CAPSULE | Refills: 3 | Status: SHIPPED | OUTPATIENT
Start: 2023-05-29 | End: 2023-12-11

## 2023-05-29 NOTE — PROGRESS NOTES
"Subjective:       Patient ID: Tracey Montenegro is a 27 y.o. female.    Chief Complaint: Gynecologic Exam (Well woman visit) and Medication Refill (Out of all medication)    The patient is a 27-year-old female.  The patient presents for well woman visit and Pap smear.     AB1  Patient's last Pap 2019 was normal with negative high-risk HPV.    Patient's last live birth was in  after that she states she had a tubal ligation.  Menses began at age 11  Patient states she menstruates every 28 days for 7 days.  Patient states her menstrual periods are painful for the 1st 3 days and her bleeding is heavy for the duration of the 7 days.  Patient instructed to take calcium plus D  Patient denies any urinary or bowel problems  Patient denies any vaginal discharge        Review of Systems   Integumentary:  Negative for color change, breast mass, breast discharge and breast tenderness.   Breast: Negative for mass and tenderness 12 point review of systems conducted, negative except as stated in the history of present illness. See HPI for details.        Objective:        Visit Vitals  /83   Pulse (!) 120   Temp 97.3 °F (36.3 °C) (Temporal)   Resp 20   Ht 5' 5" (1.651 m)   Wt 80.3 kg (177 lb)   LMP 2023   BMI 29.45 kg/m²        Physical Exam  Vitals and nursing note reviewed.   HENT:      Head: Normocephalic and atraumatic.      Right Ear: Tympanic membrane normal.      Left Ear: Tympanic membrane normal.      Nose: No congestion.      Mouth/Throat:      Mouth: Mucous membranes are moist.   Eyes:      Pupils: Pupils are equal, round, and reactive to light.   Cardiovascular:      Rate and Rhythm: Normal rate and regular rhythm.      Heart sounds: No murmur heard.  Pulmonary:      Effort: Pulmonary effort is normal.      Breath sounds: Normal breath sounds.   Abdominal:      General: Bowel sounds are normal.      Palpations: Abdomen is soft.   Genitourinary:     General: Normal vulva.      Exam position: " Lithotomy position.      Pubic Area: No rash.       Labia:         Right: No tenderness or lesion.         Left: No tenderness or lesion.       Rectum: Normal.      Comments: Labia minora and majora no lesions.  Vagina:  Normal rugae, tissue is pink without any lesions visible small amount whitish discharge that is thin.  Cervix:  Nonfriable and no lesions.  Uterus:  Midline, smooth contour no tenderness on palpation.  Bladder:  No tenderness on palpation.  Adnexa:  No cystic lesions to palpation.  Rectum: Deferred  Musculoskeletal:         General: Normal range of motion.      Cervical back: Normal range of motion and neck supple.   Lymphadenopathy:      Lower Body: No right inguinal adenopathy. No left inguinal adenopathy.   Skin:     General: Skin is warm and dry.      Capillary Refill: Capillary refill takes less than 2 seconds.   Neurological:      Mental Status: She is alert and oriented to person, place, and time.         Assessment:           ICD-10-CM ICD-9-CM   1. Well woman exam  Z01.419 V72.31   2. Persistent depressive disorder  F34.1 300.4   3. Generalized anxiety disorder  F41.1 300.02   4. Primary insomnia  F51.01 307.42   5. Migraine without aura and without status migrainosus, not intractable  G43.009 346.10            Plan:       1. Well woman exam  Pap smear and high-risk HPV  Breast exam    2. Persistent depressive disorder  Take this medication as prescribed.  Do not discontinue abruptly  - FLUoxetine (PROZAC) 40 MG capsule; Take 1 capsule (40 mg total) by mouth Daily.  Dispense: 90 capsule; Refill: 0    3. Generalized anxiety disorder  - FLUoxetine (PROZAC) 40 MG capsule; Take 1 capsule (40 mg total) by mouth Daily.  Dispense: 90 capsule; Refill: 0    4. Primary insomnia  - hydrOXYzine pamoate (VISTARIL) 25 MG Cap; Take 2 capsules (50 mg total) by mouth every evening.  Dispense: 30 capsule; Refill: 3    5. Migraine without aura and without status migrainosus, not intractable  - propranoloL  (INDERAL) 10 MG tablet; Take 3 tablets (30 mg total) by mouth 3 (three) times daily.  Dispense: 270 tablet; Refill: 11  - sumatriptan (IMITREX) 50 MG tablet; Take 1 tablet (50 mg total) by mouth once. for 1 dose  Dispense: 1 tablet; Refill: 0  - topiramate (TOPAMAX) 25 MG tablet; Take 2 tablets (50 mg total) by mouth 2 (two) times daily.  Dispense: 120 tablet; Refill: 11  - ketorolac (TORADOL) 10 mg tablet; Take 1 tablet (10 mg total) by mouth every 6 (six) hours. for 5 days  Dispense: 20 tablet; Refill: 0          Follow up in about 1 year (around 5/29/2024).     Future Appointments   Date Time Provider Department Center   6/9/2023  9:30 AM Anny Castro NP [facility]   6/4/2024  9:00 AM Anny Castro NP [facility]        Past Medical History:   Diagnosis Date    Anxiety disorder, unspecified     Carrier of group B Streptococcus     Chronic migraine without aura     Family history of breast cancer     Family history of cervical cancer     mother and maternal grandmother    Major depression, recurrent     Maternal obesity syndrome     Myopia of both eyes with astigmatism     Non-toxic multinodular goiter     Panic disorder (episodic paroxysmal anxiety)     Thyroiditis, unspecified         Review of patient's allergies indicates:   Allergen Reactions    Pepper (genus capsicum) Itching        Current Outpatient Medications   Medication Instructions    FLUoxetine (PROZAC) 40 mg, Oral, Daily    hydrOXYzine pamoate (VISTARIL) 50 mg, Oral, Nightly    ketorolac (TORADOL) 10 mg, Oral, Every 6 hours    propranoloL (INDERAL) 30 mg, Oral, 3 times daily    sumatriptan (IMITREX) 50 mg, Oral, Once    topiramate (TOPAMAX) 50 mg, Oral, 2 times daily          Patient Active Problem List   Diagnosis    Anxiety    Persistent depressive disorder    Multiple thyroid nodules    Migraine without aura    Vaginismus    Well woman exam    Generalized anxiety disorder    Primary insomnia             Past Medical History:    Diagnosis Date    Anxiety disorder, unspecified     Carrier of group B Streptococcus     Chronic migraine without aura     Family history of breast cancer     Family history of cervical cancer     mother and maternal grandmother    Major depression, recurrent     Maternal obesity syndrome     Myopia of both eyes with astigmatism     Non-toxic multinodular goiter     Panic disorder (episodic paroxysmal anxiety)     Thyroiditis, unspecified           Past Surgical History:   Procedure Laterality Date     SECTION  2017    Dr Miguel Romo     SECTION  2018    THYROGLOSSAL DUCT EXCISION  2019    Dr Eduar Huitron           reports that she has never smoked. She has never used smokeless tobacco. She reports that she does not drink alcohol and does not use drugs.    Immunization History   Administered Date(s) Administered    Influenza - Quadrivalent 2020, 10/20/2021    Influenza - Quadrivalent - PF *Preferred* (6 months and older) 10/11/2017    Influenza - Trivalent - PF (ADULT) 10/11/2017, 10/15/2019    MMR 2017    Tdap 10/11/2016, 2018, 2020, 2020        Health Maintenance   Topic Date Due    Hepatitis C Screening  Never done    Lipid Panel  Never done    Pap Smear  Never done    TETANUS VACCINE  2030

## 2023-05-30 NOTE — ADDENDUM NOTE
Addended by: NICOLE RINCON on: 5/30/2023 02:27 PM     Modules accepted: Orders     Progress Notes by Dolores Bobby PA-C at 3/20/2018  2:40 PM     Author: Dolores Bobby PA-C Service: -- Author Type: Physician Assistant    Filed: 3/22/2018  7:56 AM Date of Service: 3/20/2018  2:40 PM Status: Signed    : Dolores Bobby PA-C (Physician Assistant)       PAIN CLINIC PROGRESS NOTE    Subjective:   Keshia Arellano is a 57 y.o. female who presents for evaluation of low back pain.  She has history of lumbar arachnoiditis.  She has had numerous lumbar epidural injections in the past; worried to do repeat injections due to concern of worsening her arachnoiditis.  She has been educated on possible lumbar facet treatments or medial branch blocks to treat axial low back pain. She has other multisite pain including pelvic pain, cervicalgia, TMJ, myalgias.    Major issues:  1. Chronic pain syndrome       Pain Score: 7/10 constant aching, sore deep pain in mid and lower back. Function rated 7. Last visit pain rated 5/10.  Pain at best is 5/10, at worst is 10/10 and on average is 7/10.   Radiation of pain: neck radiates to shoulders lower back pain with radiation to hip  Paresthesias, numbness, weakness: Denies any recent numbness in her upper extremities.    Gait disturbance: positive. Ambulating with cane, denies recent falls.  Exacerbating/relieving factors: Pain improves with rest, medications, heating pad, bath with epsom salt/aromatherapy, medical cannabis, home exercise program.  Has help with cleaning, laundry as these activities exacerbate pain.  Pain increases with any activity including standing, sitting, walking, all ADLs.   Adverse effects of medications: constipation; treated with dietary, Using miralax frequently, not daily.  Some nausea and GERD.  Reports she feels tired all the time.    Associated symptoms: Occipital headaches, spasms, weight gain, depression, sleep interruption due to pain, pelvic pain symptoms.  Left hand numbness x 1 day after FCE, lower leg numbness.   "Weakness in legs, pelvis, hips.  She denies bowel bladder incontinence, unexplained weight loss.  Functional symptoms: Pain interferes with sleep, walking, work, activities of daily living, relationships/social, sexual health, mood (depressed, angry, frustrated, anxious and pain is not controlled, helpless/hopeless).  Current treatment efficacy: Fair.  Has been able to reduce dosing of morphine (see below).  Generally taking MSER 15 mg every 8-12 hours, MSIR 15 mg BID-TID prn.  Wants to discuss adjustment of her morphine dose that she is using less medical marijuana due to cost.  Current treatment compliance: Good.  Reducing opioid and adjunct medications.  Intermittent therapy with pelvic , continues with home therapy to help with pain flares.     She had FCE at [facility] 02/19/18 and she has given results to her , will not be able to meet with her until April. She requested an extension to contest the disability determination which is due 05/07/18.  She can see Dr. Gurrola on 04/17/18.   Review of the Impressions of the Physical Abilities Assessment (PAT) on 02/19/18 by Maurice Jordan PT, MA states:   \"The client was assessed in terms of mobility and material handling and static force tests.  Her performance today was compared to her assessment nearly 8 years ago.  Her active shoulder elevation, back mobility, lifting, carrying and sitting computer work tolerance of approximately 30 minutes were reassessed.  Her performance on these measures is remarkably similar and little changed from when previously evaluated in 2010.  She showed signs of postural discomfort that limited sitting work to 30 minutes.  There is no indication that her functional abilities have improved from when previously evaluated.  Based on these findings I do not think further testing over longer time periods is indicated and I do not think that it is reasonable to expect her to work at any level of physical demand on a " "full-time basis.\"     She wants to clarify her acupuncture was only 3 visits and she has not continued as she did not trust judgement of provider and was not going to get rid of the arachnoiditis in her lumbar spine.  She states she doesn't have energy, is fatigued all the time, states in pain all the time.  She states she hasn't had sleep apnea check.  She states she was told at the dentist she has high risk symptoms of it.  She brought in pain journal today and reviews some of her symptoms today; she reports she wears her sacral belt with standing, variable duration but no more than 3-4 hours.  She reports feeling \"locked up\" and breathing is still aggravating, tight across ribs and her back.      She states her orthotics caused increased pain in sacrum, legs, lower back.  She showed her physical therapist and was told to get new ones made at [facility].   She now has new ones which are now completely different, she states they are hard and causes her legs, feet, ankles to hurt worse. She has had new ones for past week and states \"she is still breaking them in.\"  She is disappointed in this.  She states the has more pronation on left side.  She will see therapist again on 03/28/18.  She continues with Dilma Durand MPT, [facility].  She continues to also do visceral manipulation therapy.  She was unable to start Optimum as this was duplication of therapy.      She takes Morphine in the morning MSIR and MSER 15 mg upon waking, she states she is always in pain when she wakes.  She sometimes has to wait an hour to get pain relief after taking. She will use medical cannabis at home if she is not leaving the house.  Will repeat MSIR in 4 hours prn.  She then repeats MSER at 2000 and also MSIR around this time and goes to bed.  IF she wakes at 0200 she takes the MSER and then she doesn't feel as much pain in the morning.  She states last night difficult to get to sleep due to leg pain.  She wants " "to discuss increasing to 4 doses a day of each.  We review CDC guidelines and that if she takes MSER 15 mg and MSIR 15 mg TID that she is at the maximum recommended daily dose.  She would like to consider doing more pharmacogenetics testing, as her initial panel done by [facility] did not include pathways including COMT and OPRM1, which account for the metabolism of morphine and codeine.  She also does not have information on CYP2B6 which would be helpful to assess if she is a good candidate to try ketamine.  She is advised that each individual screen is $200, versus running the entire panel again of 22 pathways through [facility] is $459.  Also give her information on the [facility] pharmacogenetics testing that our office uses which would be a panel for $249.    Reviewed [place] Cannabis website today for dosing:    She states this is still helpful, but she is using more sparingly as she has less finances since her disability was denied this past fall.  She is worried about how to manage her pain as she has less ability to fill her medical cannabis due to cost.  Side effects of medical cannabis include \"spaced out, less focus, less present, drowsy, less attention, sedation, altered state, cannot drive if taken at all.\"  She does find it helpful for pain at home or for sleep.    Review of Systems  A 12 point ROS was completed and was positive for headache, TMJ, pelvic pain, arthralgias, constipation, low back pain, weakness, depression with anxiety, sleep disturbance, visual disturbance requiring glasses, GERD, weight loss.  Urinary urgency and sometimes urinary incontinence.   Otherwise negative.      Objective:     Vitals:    03/20/18 1501   BP: 157/86   Pulse: 74   Resp: 16   Weight: 186 lb (84.4 kg)   Height: 5' 6\" (1.676 m)   PainSc:   7     Physical Exam  General- patient is alert and oriented, in NAD, well-groomed, well-nourished. Appears fatigued. Obese, appears stated age.  Presents alone today.    Psych- Judgment " and insight normal, AOx4, recent and remote memory normal, mood and affect tearful, concerned, depressed  Respiratory- breathing is non-labored, regular rate and rhythm.  Cardiovascular- extremities warm and well perfused, no peripheral edema or varicosities  Dermatologic - Exposed skin is CDI  Musculoskeletal - Ambulating with single-point cane with slight antalgia on the right.  Able to sit to stand with difficulty.     *Opioid [place] Precautions:    UDS/Swab - 10/04/17 as expected  Opioid Consent - due  Opioid Agreement - 08/09/2017  Pharmacy- as documented    [place]  Reviewed - 03/20/18   as expected  Pill Count - 03/20/18 Morphine ER #43 and MSIR #39  Psychological evaluation - outside source  MME - up to 90 but averaging around 60 with use of medical cannabis.  Pharmacogenetic testing - yes  KAILASH 01/24/18    Imaging:  None new.    Assessment:   Keshia Arellano is a 57 y.o. female seen in clinic today for chronic intractable lower back pain secondary to arachnoiditis that affects her QOL and ADLs.  Symptoms are stable and improved with MSContin and MSIR. She started medical cannabis and has done opioid reduction, along with discontinuation of Naproxen, Baclofen, and Ambien.  She has concerns about ability to continue medical cannabis due to cost and is pursuing with her  to contest the denial of her disability as she recent had a Physical Abilities Assessment (PAT).  She has concerns about controlling her pain on current morphine dose, recommend checking pharmacogenetic test regarding OPRM and COMT genes to see if there is altered metabolism which may allow for adjustment in dosing of morphine.  Will review this in conjunction with our pharmacist at the pain center.      Greater than 50% of this 40 minute visit spent in face to face time regarding pain symptoms and treatment options, discussion of her disability denial concerns and reviewing documentation she has regarding PAT, also discussion of  pharmacogenetic testing options for medication management.      Plan:   Continue Morphine extended release 15 mg 2-3 times a day.    Continue Morphine immediate release 15 mg up to 2-3 tablets per day.  Ok to fill on or after 03/28/2018  Continue use of medical cannabis as prescribed  Discussed adding to pharmacogenetic testing and patient will notify of decision made to repeat with Genelex versus Millenium.  If this is completed, will have you follow-up with Rola Goff PharmD here at the pain center for result interpretation with current medications.  Follow-up in 4 weeks with Dolores Bobby PA-C  [facility]  [address]  [place] [address]  Ph: [phone]  Fax: [phone]

## 2023-06-01 LAB — PSYCHE PATHOLOGY RESULT: NORMAL

## 2023-08-09 NOTE — DISCHARGE INSTRUCTIONS
Decrease taking trokendi xr 50mg for 7 days then stop    Start acetazolamide 125 mg twice a day (second dose is late afternoon/early evening) for 7 days. Increase to 2 tabs twice a day. At onset of migraine or Aura take Ubrelvy 100 mg. May repeat in 2 hours if needed. Limit of 200 mg in 24 hours.     May also use prochlorperazine at onset or with the [facility]  May use caffeine if needed to break headaches Follow up with neurologist

## 2023-12-11 ENCOUNTER — OFFICE VISIT (OUTPATIENT)
Dept: FAMILY MEDICINE | Facility: CLINIC | Age: 28
End: 2023-12-11
Payer: MEDICAID

## 2023-12-11 VITALS
TEMPERATURE: 98 F | BODY MASS INDEX: 30.16 KG/M2 | SYSTOLIC BLOOD PRESSURE: 134 MMHG | HEIGHT: 65 IN | DIASTOLIC BLOOD PRESSURE: 89 MMHG | WEIGHT: 181 LBS | HEART RATE: 116 BPM | RESPIRATION RATE: 20 BRPM

## 2023-12-11 DIAGNOSIS — G43.009 MIGRAINE WITHOUT AURA AND WITHOUT STATUS MIGRAINOSUS, NOT INTRACTABLE: ICD-10-CM

## 2023-12-11 DIAGNOSIS — J32.0 MAXILLARY SINUSITIS, UNSPECIFIED CHRONICITY: ICD-10-CM

## 2023-12-11 PROCEDURE — 3079F DIAST BP 80-89 MM HG: CPT | Mod: CPTII,,, | Performed by: NURSE PRACTITIONER

## 2023-12-11 PROCEDURE — 3075F PR MOST RECENT SYSTOLIC BLOOD PRESS GE 130-139MM HG: ICD-10-PCS | Mod: CPTII,,, | Performed by: NURSE PRACTITIONER

## 2023-12-11 PROCEDURE — 3075F SYST BP GE 130 - 139MM HG: CPT | Mod: CPTII,,, | Performed by: NURSE PRACTITIONER

## 2023-12-11 PROCEDURE — 99213 PR OFFICE/OUTPT VISIT, EST, LEVL III, 20-29 MIN: ICD-10-PCS | Mod: ,,, | Performed by: NURSE PRACTITIONER

## 2023-12-11 PROCEDURE — 3008F PR BODY MASS INDEX (BMI) DOCUMENTED: ICD-10-PCS | Mod: CPTII,,, | Performed by: NURSE PRACTITIONER

## 2023-12-11 PROCEDURE — 99213 OFFICE O/P EST LOW 20 MIN: CPT | Mod: ,,, | Performed by: NURSE PRACTITIONER

## 2023-12-11 PROCEDURE — 3079F PR MOST RECENT DIASTOLIC BLOOD PRESSURE 80-89 MM HG: ICD-10-PCS | Mod: CPTII,,, | Performed by: NURSE PRACTITIONER

## 2023-12-11 PROCEDURE — 1159F MED LIST DOCD IN RCRD: CPT | Mod: CPTII,,, | Performed by: NURSE PRACTITIONER

## 2023-12-11 PROCEDURE — 1159F PR MEDICATION LIST DOCUMENTED IN MEDICAL RECORD: ICD-10-PCS | Mod: CPTII,,, | Performed by: NURSE PRACTITIONER

## 2023-12-11 PROCEDURE — 3008F BODY MASS INDEX DOCD: CPT | Mod: CPTII,,, | Performed by: NURSE PRACTITIONER

## 2023-12-11 RX ORDER — METHYLPREDNISOLONE ACETATE 40 MG/ML
40 INJECTION, SUSPENSION INTRA-ARTICULAR; INTRALESIONAL; INTRAMUSCULAR; SOFT TISSUE
Status: COMPLETED | OUTPATIENT
Start: 2023-12-11 | End: 2023-12-11

## 2023-12-11 RX ORDER — MONTELUKAST SODIUM 10 MG/1
10 TABLET ORAL NIGHTLY
Qty: 30 TABLET | Refills: 0 | Status: SHIPPED | OUTPATIENT
Start: 2023-12-11 | End: 2024-01-10

## 2023-12-11 RX ORDER — AZELASTINE 1 MG/ML
1 SPRAY, METERED NASAL 2 TIMES DAILY
Qty: 3 ML | Refills: 6 | Status: SHIPPED | OUTPATIENT
Start: 2023-12-11 | End: 2024-12-10

## 2023-12-11 RX ORDER — KETOROLAC TROMETHAMINE 10 MG/1
10 TABLET, FILM COATED ORAL EVERY 6 HOURS PRN
Qty: 40 TABLET | Refills: 0 | Status: SHIPPED | OUTPATIENT
Start: 2023-12-11

## 2023-12-11 RX ORDER — KETOROLAC TROMETHAMINE 10 MG/1
10 TABLET, FILM COATED ORAL EVERY 6 HOURS PRN
COMMUNITY
End: 2023-12-11 | Stop reason: SDUPTHER

## 2023-12-11 RX ADMIN — METHYLPREDNISOLONE ACETATE 40 MG: 40 INJECTION, SUSPENSION INTRA-ARTICULAR; INTRALESIONAL; INTRAMUSCULAR; SOFT TISSUE at 02:12

## 2023-12-11 NOTE — PROGRESS NOTES
"Subjective:       Patient ID: Tracey Montenegro is a 28 y.o. female.    Chief Complaint: Cough (Cough, sore throat, congestion X's 3 weeks) and Headache (Refill migraine medication)      Patient with a history of migraine headaches is requesting a refill of Toradol.  She states the prescription last her about 4 months.    Patient is also complaining of sinus congestion, sore throat, and a cough.        Cough  This is a new problem. The current episode started 1 to 4 weeks ago. The problem has been gradually worsening. The cough is Productive of sputum. Associated symptoms include chest pain, headaches, a sore throat, shortness of breath and wheezing. Pertinent negatives include no chills, ear congestion, ear pain, fever, heartburn, hemoptysis, myalgias, nasal congestion, postnasal drip, rash, rhinorrhea, sweats or weight loss. She has tried OTC cough suppressant for the symptoms. The treatment provided no relief. There is no history of asthma, bronchiectasis, bronchitis, COPD, emphysema, environmental allergies or pneumonia.     Review of Systems   Constitutional:  Negative for chills, fever and weight loss.   HENT:  Positive for sore throat. Negative for ear pain, postnasal drip and rhinorrhea.    Respiratory:  Positive for cough, shortness of breath and wheezing. Negative for hemoptysis.    Cardiovascular:  Positive for chest pain.   Gastrointestinal:  Negative for heartburn.   Musculoskeletal:  Negative for myalgias.   Integumentary:  Negative for rash.   Allergic/Immunologic: Negative for environmental allergies.   Neurological:  Positive for headaches.   12 point review of systems conducted, negative except as stated in the history of present illness. See HPI for details.      Objective:      Visit Vitals  /89   Pulse (!) 116   Temp 98.2 °F (36.8 °C)   Resp 20   Ht 5' 5" (1.651 m)   Wt 82.1 kg (181 lb)   LMP 12/04/2023   BMI 30.12 kg/m²     Physical Exam  Constitutional:       Appearance: She is " obese.   HENT:      Head: Normocephalic.      Ears:      Comments: Bilateral TMs bulging with effusion     Nose: Congestion and rhinorrhea present.      Mouth/Throat:      Pharynx: Oropharyngeal exudate present.   Eyes:      Extraocular Movements: Extraocular movements intact.   Cardiovascular:      Rate and Rhythm: Normal rate and regular rhythm.      Heart sounds: No murmur heard.  Pulmonary:      Effort: Pulmonary effort is normal.   Abdominal:      General: Bowel sounds are normal.      Palpations: Abdomen is soft.   Musculoskeletal:         General: Normal range of motion.      Cervical back: Normal range of motion and neck supple.   Skin:     General: Skin is warm and dry.   Neurological:      Mental Status: She is alert and oriented to person, place, and time.       Current Outpatient Medications   Medication Instructions    azelastine (ASTELIN) 137 mcg, Nasal, 2 times daily    ketorolac (TORADOL) 10 mg, Oral, Every 6 hours PRN    montelukast (SINGULAIR) 10 mg, Oral, Nightly    topiramate (TOPAMAX) 50 mg, Oral, 2 times daily     is allergic to pepper (genus capsicum).       Assessment:         ICD-10-CM ICD-9-CM   1. Maxillary sinusitis, unspecified chronicity  J32.0 473.0   2. Migraine without aura and without status migrainosus, not intractable  G43.009 346.10          Plan:       1. Maxillary sinusitis, unspecified chronicity  Depo-Medrol 40 mg given in the right deltoid by me  - montelukast (SINGULAIR) 10 mg tablet; Take 1 tablet (10 mg total) by mouth every evening.  Dispense: 30 tablet; Refill: 0  - azelastine (ASTELIN) 137 mcg (0.1 %) nasal spray; 1 spray (137 mcg total) by Nasal route 2 (two) times daily.  Dispense: 3 mL; Refill: 6    2. Migraine without aura and without status migrainosus, not intractable  Controlled  Refill prescription medication  - ketorolac (TORADOL) 10 mg tablet; Take 1 tablet (10 mg total) by mouth every 6 (six) hours as needed for Pain.  Dispense: 40 tablet; Refill: 0         Follow up if symptoms worsen or fail to improve.     Future Appointments   Date Time Provider Department Center   6/4/2024  9:00 AM Anny Castro NP [facility] [place]

## 2024-12-03 ENCOUNTER — HOSPITAL ENCOUNTER (EMERGENCY)
Facility: HOSPITAL | Age: 29
Discharge: HOME | End: 2024-12-03
Attending: FAMILY MEDICINE
Payer: MEDICAID

## 2024-12-03 VITALS
TEMPERATURE: 98 F | HEIGHT: 65 IN | WEIGHT: 170 LBS | HEART RATE: 90 BPM | OXYGEN SATURATION: 100 % | BODY MASS INDEX: 28.32 KG/M2 | SYSTOLIC BLOOD PRESSURE: 130 MMHG | RESPIRATION RATE: 20 BRPM | DIASTOLIC BLOOD PRESSURE: 91 MMHG

## 2024-12-03 DIAGNOSIS — S93.491A SPRAIN OF ANTERIOR TALOFIBULAR LIGAMENT OF RIGHT ANKLE, INITIAL ENCOUNTER: Primary | ICD-10-CM

## 2024-12-03 PROCEDURE — 99283 EMERGENCY DEPT VISIT LOW MDM: CPT | Mod: 25

## 2024-12-03 NOTE — DISCHARGE INSTRUCTIONS
Would advise stopping the walking boot for now. Wear ankle splint, velcro etc, that are available over the counter.    Follow up with your PCP or Orthopedic Surgeon for further tests such as MRI, also consider Physical Therapy if not getting any improvement.

## 2024-12-03 NOTE — ED PROVIDER NOTES
Encounter Date: 12/3/2024       History     Chief Complaint   Patient presents with    Ankle Pain     Pain to right ankle after her dog tripped her one month ago LOP 10/10 unrelieved with tylenol or motrin.     Pt to ER with right ankle pain for about a month. Tripped over a chain, had xrays done at  (pt reports negative). She wore a walking boot for a week or so, pt reports the ankle still hurts.    The history is provided by the patient.     Review of patient's allergies indicates:   Allergen Reactions    Pepper (genus capsicum) Itching     Past Medical History:   Diagnosis Date    Anxiety disorder, unspecified     Carrier of group B Streptococcus     Chronic migraine without aura     Family history of breast cancer     Family history of cervical cancer     mother and maternal grandmother    Major depression, recurrent     Maternal obesity syndrome     Myopia of both eyes with astigmatism     Non-toxic multinodular goiter     Panic disorder (episodic paroxysmal anxiety)     Thyroiditis, unspecified      Past Surgical History:   Procedure Laterality Date     SECTION  2017    Dr Miguel Romo     SECTION  2018    THYROGLOSSAL DUCT EXCISION  2019    Dr Eduar Huitron     Family History   Problem Relation Name Age of Onset    Cervical cancer Mother      Bipolar disorder Mother      Depression Mother      Lung cancer Maternal Grandmother      Breast cancer Maternal Grandmother      Cervical cancer Maternal Grandmother      Bipolar disorder Maternal Grandmother      Depression Maternal Grandmother      Thyroid disease Maternal Aunt       Social History     Tobacco Use    Smoking status: Never     Passive exposure: Never    Smokeless tobacco: Never   Substance Use Topics    Alcohol use: Never    Drug use: Never     Review of Systems   Constitutional:  Negative for fever.   HENT:  Negative for sore throat.    Respiratory:  Negative for shortness of breath.    Cardiovascular:  Negative  for chest pain.   Gastrointestinal:  Negative for nausea.   Genitourinary:  Negative for dysuria.   Musculoskeletal:  Negative for back pain.   Skin:  Negative for rash.   Neurological:  Negative for weakness.   Hematological:  Does not bruise/bleed easily.   All other systems reviewed and are negative.      Physical Exam     Initial Vitals   BP Pulse Resp Temp SpO2   12/03/24 1015 12/03/24 1014 12/03/24 1015 12/03/24 1015 12/03/24 1015   (!) 130/91 90 20 98.4 °F (36.9 °C) 100 %      MAP       --                Physical Exam    Nursing note and vitals reviewed.  Constitutional: She appears well-developed and well-nourished.   Cardiovascular:  Normal heart sounds.           Pulmonary/Chest: Breath sounds normal.   Abdominal: Abdomen is soft. Bowel sounds are normal. There is no abdominal tenderness.   Musculoskeletal:         General: No edema. Normal range of motion.      Comments: Right ankle, mild anterolateral tenderness. No swelling, stable ankle. Distal cap refill <3 sec, sensation intact. 2+ DP/PT pulses.     Neurological: She is alert and oriented to person, place, and time.   Skin: Skin is warm and dry. Capillary refill takes less than 2 seconds.   Psychiatric: She has a normal mood and affect.         ED Course   Procedures  Labs Reviewed - No data to display       Imaging Results              X-Ray Ankle 2 View Right (Final result)  Result time 12/03/24 10:30:44      Final result by Celestino Santoyo MD (12/03/24 10:30:44)                   Impression:      No acute findings      Electronically signed by: Celestino Santoyo MD  Date:    12/03/2024  Time:    10:30               Narrative:    EXAMINATION:  Two views right ankle    CLINICAL HISTORY:  Pain    COMPARISON:  08/04/2017    FINDINGS:  No displaced fracture or dislocation.  No talar dome lesions.                                       Medications - No data to display  Medical Decision Making                                    Clinical Impression:  Final  diagnoses:  [S93.491A] Sprain of anterior talofibular ligament of right ankle, initial encounter (Primary)          ED Disposition Condition    Discharge Stable          ED Prescriptions    None       Follow-up Information       Follow up With Specialties Details Why Contact Info    Anny Castro, [facility] Call  As needed [address] [facility] [address]  [phone]               Irineo Nieves MD  12/03/24 9325

## 2024-12-03 NOTE — ED NOTES
Patient ambulated to ER room 4 with steady gait. Pt has a boot to right lower leg and stated that a month ago she injured her right ankle due to a tripping and was seen and treated in an urgent care facility.  Patient was given a boot and told that there was nothing wrong with her right foot.  Stated that for the past month she has worn the boot, but pain is not getting any better.  Pt states she has been taking tylenol and ibuprofen, but it's not helping the pain.  Upon assessment right ankle is tender upon palpation, but no swelling or deformities noted.  Pedal pulse +2.  Patient rates pain a 10/10 on pain scale. Family at the bedside.

## 2025-05-28 DIAGNOSIS — S92.255D NONDISPLACED FRACTURE OF NAVICULAR (SCAPHOID) OF LEFT FOOT, SUBSEQUENT ENCOUNTER FOR FRACTURE WITH ROUTINE HEALING: Primary | ICD-10-CM

## 2025-05-29 ENCOUNTER — HOSPITAL ENCOUNTER (OUTPATIENT)
Dept: RADIOLOGY | Facility: HOSPITAL | Age: 30
Discharge: HOME | End: 2025-05-29
Attending: NURSE PRACTITIONER
Payer: MEDICAID

## 2025-05-29 DIAGNOSIS — S92.255D NONDISPLACED FRACTURE OF NAVICULAR (SCAPHOID) OF LEFT FOOT, SUBSEQUENT ENCOUNTER FOR FRACTURE WITH ROUTINE HEALING: ICD-10-CM

## 2025-05-29 PROCEDURE — 73718 MRI LOWER EXTREMITY W/O DYE: CPT | Mod: TC,LT
